# Patient Record
Sex: FEMALE | Race: WHITE
[De-identification: names, ages, dates, MRNs, and addresses within clinical notes are randomized per-mention and may not be internally consistent; named-entity substitution may affect disease eponyms.]

---

## 2019-09-23 ENCOUNTER — FORM ENCOUNTER (OUTPATIENT)
Age: 49
End: 2019-09-23

## 2020-02-10 ENCOUNTER — FORM ENCOUNTER (OUTPATIENT)
Age: 50
End: 2020-02-10

## 2021-02-19 ENCOUNTER — TRANSCRIPTION ENCOUNTER (OUTPATIENT)
Age: 51
End: 2021-02-19

## 2021-03-09 ENCOUNTER — APPOINTMENT (OUTPATIENT)
Dept: BREAST CENTER | Facility: CLINIC | Age: 51
End: 2021-03-09

## 2021-04-16 PROBLEM — Z00.00 ENCOUNTER FOR PREVENTIVE HEALTH EXAMINATION: Status: ACTIVE | Noted: 2021-04-16

## 2021-04-21 DIAGNOSIS — N63.0 UNSPECIFIED LUMP IN UNSPECIFIED BREAST: ICD-10-CM

## 2021-04-21 DIAGNOSIS — G35 MULTIPLE SCLEROSIS: ICD-10-CM

## 2021-04-23 ENCOUNTER — APPOINTMENT (OUTPATIENT)
Dept: BREAST CENTER | Facility: CLINIC | Age: 51
End: 2021-04-23
Payer: COMMERCIAL

## 2021-04-23 VITALS
BODY MASS INDEX: 21.34 KG/M2 | DIASTOLIC BLOOD PRESSURE: 79 MMHG | HEIGHT: 64 IN | WEIGHT: 125 LBS | HEART RATE: 80 BPM | SYSTOLIC BLOOD PRESSURE: 111 MMHG

## 2021-04-23 PROCEDURE — 99072 ADDL SUPL MATRL&STAF TM PHE: CPT

## 2021-04-23 PROCEDURE — 99213 OFFICE O/P EST LOW 20 MIN: CPT

## 2021-04-23 NOTE — REASON FOR VISIT
[Follow-Up: _____] : a [unfilled] follow-up visit [FreeTextEntry1] : The patient comes in with a history of bilateral silicone augmentation implants performed back in 2004 and also has a history of MS.  She has had a history of fibrocystic mastopathy and comes in for routine yearly follow-up

## 2021-04-23 NOTE — HISTORY OF PRESENT ILLNESS
[FreeTextEntry1] : The patient is a 50-year-old  white female with Greenlandic and Moldovan descent.  She underwent menarche at age 15 and had her first child at age 27.  She has no family history of breast or ovarian cancer.  She has a history of bilateral silicone augmentation implants placed back in .  She also has a history of MS.  She underwent a screening mammography on 2019 at [facility] which showed some left breast asymmetry in the upper outer quadrant and inner quadrant and a lobulated density in the right breast 4:00 region.  Diagnostic left breast mammography and ultrasound on 2019 showed the asymmetry in the left breast upper outer quadrant to dissipate but she continued to have some asymmetry in the inner quadrant and ultrasound showed a 4 mm density in the 8:00 region of the left breast and ultrasound-guided core biopsy on 2019 showed a radial sclerosing lesion with usual duct hyperplasia.  She had a directed right breast ultrasound showing the right breast 4:00 density to be benign appearing and slide review of the left breast biopsy was consistent with a benign radial sclerosing lesion.  She obtained follow-up of this area which has no longer been visualized on ultrasound and comes in for yearly clinical exam

## 2021-04-23 NOTE — ASSESSMENT
[FreeTextEntry1] : The patient is a 50-year-old  white female with Norwegian and [place] descent.  She underwent menarche at age 15 and had her first child at age 27.  She has no family history of breast or ovarian cancer.  She has a history of bilateral silicone augmentation implants placed back in 2004.  She also has a history of MS.  She underwent a screening mammography on 2019 at [facility] which showed some left breast asymmetry in the upper outer quadrant and inner quadrant and a lobulated density in the right breast 4:00 region.  Diagnostic left breast mammography and ultrasound on 2019 showed the asymmetry in the left breast upper outer quadrant to dissipate but she continued to have some asymmetry in the inner quadrant and ultrasound showed a 4 mm density in the 8:00 region of the left breast and ultrasound-guided core biopsy on 2019 showed a radial sclerosing lesion with usual duct hyperplasia.  She had a directed right breast ultrasound showing the right breast 4:00 density to be benign appearing and slide review of the left breast biopsy was consistent with a benign radial sclerosing lesion.  She obtained follow-up of this area which has no longer been visualized on ultrasound.  On exam today I cannot feel any suspicious densities in either breast.  She underwent a bilateral mammography and ultrasound on 2021 which did show some asymmetry in the left breast lower inner quadrant but compression views and ultrasound showed this to be unchanged with a left breast 8:00 2 mm density which was previously biopsied and benign.  The patient was reassured and should follow-up again in 1 year and her next bilateral mammography and ultrasound will be due in 2022.

## 2021-04-23 NOTE — PHYSICAL EXAM
[Normocephalic] : normocephalic [Atraumatic] : atraumatic [EOMI] : extra ocular movement intact [Supple] : supple [No Supraclavicular Adenopathy] : no supraclavicular adenopathy [No Cervical Adenopathy] : no cervical adenopathy [Examined in the supine and seated position] : examined in the supine and seated position [No dominant masses] : no dominant masses in right breast  [No dominant masses] : no dominant masses left breast [No Nipple Retraction] : no left nipple retraction [No Nipple Discharge] : no left nipple discharge [Breast Mass Right Breast ___cm] : no masses [Breast Mass Left Breast ___cm] : no masses [Breast Nipple Inversion] : nipples not inverted [Breast Nipple Retraction] : nipples not retracted [Breast Nipple Flattening] : nipples not flattened [Breast Nipple Fissures] : nipples not fissured [Breast Abnormal Lactation (Galactorrhea)] : no galactorrhea [Breast Abnormal Secretion Bloody Fluid] : no bloody discharge [Breast Abnormal Secretion Serous Fluid] : no serous discharge [Breast Abnormal Secretion Opalescent Fluid] : no milky discharge [No Axillary Lymphadenopathy] : no left axillary lymphadenopathy [No Edema] : no edema [No Rashes] : no rashes [No Ulceration] : no ulceration [de-identified] : On exam, the patient has B-cup breasts with obvious bilateral augmentation implants.  I cannot feel any suspicious densities in either breast.  She has no axillary, supraclavicular, or cervical adenopathy.

## 2022-04-13 NOTE — HISTORY OF PRESENT ILLNESS
[FreeTextEntry1] : The patient is a 51-year-old  white female with Japanese and Jamaican descent.  She underwent menarche at age 15 and had her first child at age 27.  She has no family history of breast or ovarian cancer.  She has a history of bilateral silicone augmentation implants placed back in .  She also has a history of MS.  She underwent a screening mammography on 2019 at [facility] which showed some left breast asymmetry in the upper outer quadrant and inner quadrant and a lobulated density in the right breast 4:00 region.  Diagnostic left breast mammography and ultrasound on 2019 showed the asymmetry in the left breast upper outer quadrant to dissipate but she continued to have some asymmetry in the inner quadrant and ultrasound showed a 4 mm density in the 8:00 region of the left breast and ultrasound-guided core biopsy on 2019 showed a radial sclerosing lesion with usual duct hyperplasia.  She had a directed right breast ultrasound showing the right breast 4:00 density to be benign appearing and slide review of the left breast biopsy was consistent with a benign radial sclerosing lesion.  She obtained follow-up of this area which has no longer been visualized on ultrasound and comes in for yearly clinical exam and she continues to get yearly mammography and ultrasound.

## 2022-04-13 NOTE — PHYSICAL EXAM
[Normocephalic] : normocephalic [Atraumatic] : atraumatic [EOMI] : extra ocular movement intact [Supple] : supple [No Supraclavicular Adenopathy] : no supraclavicular adenopathy [No Cervical Adenopathy] : no cervical adenopathy [Examined in the supine and seated position] : examined in the supine and seated position [No dominant masses] : no dominant masses in right breast  [No dominant masses] : no dominant masses left breast [No Nipple Retraction] : no left nipple retraction [No Nipple Discharge] : no left nipple discharge [Breast Mass Right Breast ___cm] : no masses [Breast Mass Left Breast ___cm] : no masses [Breast Nipple Inversion] : nipples not inverted [Breast Nipple Retraction] : nipples not retracted [Breast Nipple Flattening] : nipples not flattened [Breast Nipple Fissures] : nipples not fissured [Breast Abnormal Lactation (Galactorrhea)] : no galactorrhea [Breast Abnormal Secretion Bloody Fluid] : no bloody discharge [Breast Abnormal Secretion Serous Fluid] : no serous discharge [Breast Abnormal Secretion Opalescent Fluid] : no milky discharge [No Axillary Lymphadenopathy] : no left axillary lymphadenopathy [No Edema] : no edema [No Rashes] : no rashes [No Ulceration] : no ulceration [de-identified] : On exam, the patient has B-cup breasts with obvious bilateral augmentation implants.  I cannot feel any suspicious densities in either breast.  She has no axillary, supraclavicular, or cervical adenopathy.

## 2022-04-13 NOTE — ASSESSMENT
[FreeTextEntry1] : The patient is a 51-year-old  white female with Jordanian and Cook Islander descent.  She underwent menarche at age 15 and had her first child at age 27.  She has no family history of breast or ovarian cancer.  She has a history of bilateral silicone augmentation implants placed back in .  She also has a history of MS.  She underwent a screening mammography on 2019 at [facility] which showed some left breast asymmetry in the upper outer quadrant and inner quadrant and a lobulated density in the right breast 4:00 region.  Diagnostic left breast mammography and ultrasound on 2019 showed the asymmetry in the left breast upper outer quadrant to dissipate but she continued to have some asymmetry in the inner quadrant and ultrasound showed a 4 mm density in the 8:00 region of the left breast and ultrasound-guided core biopsy on 2019 showed a radial sclerosing lesion with usual duct hyperplasia.  She had a directed right breast ultrasound showing the right breast 4:00 density to be benign appearing and slide review of the left breast biopsy was consistent with a benign radial sclerosing lesion.  She obtained follow-up of this area which has no longer been visualized on ultrasound.  On exam today I cannot feel any suspicious densities in either breast.  She underwent her last bilateral mammography and ultrasound which was reviewed from  ???????? on 2021 which did show some asymmetry in the left breast lower inner quadrant but compression views and ultrasound showed this to be unchanged with a left breast 8:00 2 mm density which was previously biopsied and benign.  The patient was reassured and should follow-up again in 1 year and her next bilateral mammography and ultrasound will be due in ??????? and she was given prescriptions.

## 2022-04-18 ENCOUNTER — APPOINTMENT (OUTPATIENT)
Dept: BREAST CENTER | Facility: CLINIC | Age: 52
End: 2022-04-18
Payer: COMMERCIAL

## 2022-05-23 NOTE — ASSESSMENT
[FreeTextEntry1] : The patient is a 52-year-old  white female with Brazilian and Bruneian descent.  She underwent menarche at age 15 and had her first child at age 27.  She has no family history of breast or ovarian cancer.  She has a history of bilateral silicone augmentation implants placed back in .  She also has a history of MS.  She underwent a screening mammography on 2019 at [facility] which showed some left breast asymmetry in the upper outer quadrant and inner quadrant and a lobulated density in the right breast 4:00 region.  Diagnostic left breast mammography and ultrasound on 2019 showed the asymmetry in the left breast upper outer quadrant to dissipate but she continued to have some asymmetry in the inner quadrant and ultrasound showed a 4 mm density in the 8:00 region of the left breast and ultrasound-guided core biopsy on 2019 showed a radial sclerosing lesion with usual duct hyperplasia.  She had a directed right breast ultrasound showing the right breast 4:00 density to be benign appearing and slide review of the left breast biopsy was consistent with a benign radial sclerosing lesion.  She obtained follow-up of this area which has no longer been visualized on ultrasound.  On exam today, I cannot feel any suspicious densities in either breast.  She underwent her last bilateral mammography and ultrasound which was reviewed from  ???????? on 2021 which did show some asymmetry in the left breast lower inner quadrant but compression views and ultrasound showed this to be unchanged with a left breast 8:00 2 mm density which was previously biopsied and benign.  The patient was reassured and should follow-up again in 1 year and her next bilateral mammography and ultrasound will be due in ??????? and she was given prescriptions.

## 2022-05-23 NOTE — PHYSICAL EXAM
[Normocephalic] : normocephalic [Atraumatic] : atraumatic [EOMI] : extra ocular movement intact [Supple] : supple [No Supraclavicular Adenopathy] : no supraclavicular adenopathy [No Cervical Adenopathy] : no cervical adenopathy [Examined in the supine and seated position] : examined in the supine and seated position [No dominant masses] : no dominant masses in right breast  [No dominant masses] : no dominant masses left breast [No Nipple Retraction] : no left nipple retraction [No Nipple Discharge] : no left nipple discharge [Breast Mass Right Breast ___cm] : no masses [Breast Mass Left Breast ___cm] : no masses [Breast Nipple Inversion] : nipples not inverted [Breast Nipple Retraction] : nipples not retracted [Breast Nipple Flattening] : nipples not flattened [Breast Nipple Fissures] : nipples not fissured [Breast Abnormal Lactation (Galactorrhea)] : no galactorrhea [Breast Abnormal Secretion Bloody Fluid] : no bloody discharge [Breast Abnormal Secretion Serous Fluid] : no serous discharge [Breast Abnormal Secretion Opalescent Fluid] : no milky discharge [No Axillary Lymphadenopathy] : no left axillary lymphadenopathy [No Edema] : no edema [No Rashes] : no rashes [No Ulceration] : no ulceration [de-identified] : On exam, the patient has B-cup breasts with obvious bilateral augmentation implants.  I cannot feel any suspicious densities in either breast.  She has no axillary, supraclavicular, or cervical adenopathy.

## 2022-05-23 NOTE — HISTORY OF PRESENT ILLNESS
[FreeTextEntry1] : The patient is a 52-year-old  white female with Lithuanian and Nicaraguan descent.  She underwent menarche at age 15 and had her first child at age 27.  She has no family history of breast or ovarian cancer.  She has a history of bilateral silicone augmentation implants placed back in .  She also has a history of MS.  She underwent a screening mammography on 2019 at [facility] which showed some left breast asymmetry in the upper outer quadrant and inner quadrant and a lobulated density in the right breast 4:00 region.  Diagnostic left breast mammography and ultrasound on 2019 showed the asymmetry in the left breast upper outer quadrant to dissipate but she continued to have some asymmetry in the inner quadrant and ultrasound showed a 4 mm density in the 8:00 region of the left breast and ultrasound-guided core biopsy on 2019 showed a radial sclerosing lesion with usual duct hyperplasia.  She had a directed right breast ultrasound showing the right breast 4:00 density to be benign appearing and slide review of the left breast biopsy was consistent with a benign radial sclerosing lesion.  She obtained follow-up of this area which has no longer been visualized on ultrasound and comes in for yearly clinical exam and she continues to get yearly mammography and ultrasound.

## 2022-05-24 ENCOUNTER — APPOINTMENT (OUTPATIENT)
Dept: BREAST CENTER | Facility: CLINIC | Age: 52
End: 2022-05-24
Payer: COMMERCIAL

## 2022-06-27 ENCOUNTER — APPOINTMENT (OUTPATIENT)
Dept: BREAST CENTER | Facility: CLINIC | Age: 52
End: 2022-06-27
Payer: COMMERCIAL

## 2022-06-27 DIAGNOSIS — D24.2 BENIGN NEOPLASM OF LEFT BREAST: ICD-10-CM

## 2022-06-27 PROCEDURE — 99213 OFFICE O/P EST LOW 20 MIN: CPT

## 2022-06-27 NOTE — HISTORY OF PRESENT ILLNESS
[FreeTextEntry1] : The patient is a 52-year-old  white female with Kinyarwanda and Greek descent.  She underwent menarche at age 15 and had her first child at age 27.  She has no family history of breast or ovarian cancer.  She has a history of bilateral silicone augmentation implants placed back in .  She also has a history of MS.  She underwent a screening mammography on 2019 at [facility] which showed some left breast asymmetry in the upper outer quadrant and inner quadrant and a lobulated density in the right breast 4:00 region.  Diagnostic left breast mammography and ultrasound on 2019 showed the asymmetry in the left breast upper outer quadrant to dissipate but she continued to have some asymmetry in the inner quadrant and ultrasound showed a 4 mm density in the 8:00 region of the left breast and ultrasound-guided core biopsy on 2019 showed a radial sclerosing lesion with usual duct hyperplasia.  She had a directed right breast ultrasound showing the right breast 4:00 density to be benign appearing and slide review of the left breast biopsy was consistent with a benign radial sclerosing lesion.  She obtained follow-up of this area which has no longer been visualized on ultrasound and comes in for yearly clinical exam and she continues to get yearly mammography and ultrasound.

## 2022-06-27 NOTE — PHYSICAL EXAM
[Normocephalic] : normocephalic [Atraumatic] : atraumatic [EOMI] : extra ocular movement intact [Supple] : supple [No Supraclavicular Adenopathy] : no supraclavicular adenopathy [No Cervical Adenopathy] : no cervical adenopathy [Examined in the supine and seated position] : examined in the supine and seated position [No dominant masses] : no dominant masses in right breast  [No dominant masses] : no dominant masses left breast [No Nipple Retraction] : no left nipple retraction [No Nipple Discharge] : no left nipple discharge [Breast Mass Right Breast ___cm] : no masses [Breast Mass Left Breast ___cm] : no masses [No Axillary Lymphadenopathy] : no left axillary lymphadenopathy [No Edema] : no edema [No Rashes] : no rashes [No Ulceration] : no ulceration [Breast Nipple Inversion] : nipples not inverted [Breast Nipple Retraction] : nipples not retracted [Breast Nipple Flattening] : nipples not flattened [Breast Nipple Fissures] : nipples not fissured [Breast Abnormal Lactation (Galactorrhea)] : no galactorrhea [Breast Abnormal Secretion Bloody Fluid] : no bloody discharge [Breast Abnormal Secretion Serous Fluid] : no serous discharge [Breast Abnormal Secretion Opalescent Fluid] : no milky discharge [de-identified] : On exam, the patient has B-cup breasts with obvious bilateral augmentation implants.  I cannot feel any suspicious densities in either breast.  She has no axillary, supraclavicular, or cervical adenopathy.

## 2023-08-08 ENCOUNTER — APPOINTMENT (OUTPATIENT)
Dept: BREAST CENTER | Facility: CLINIC | Age: 53
End: 2023-08-08
Payer: COMMERCIAL

## 2023-08-08 VITALS
HEIGHT: 64 IN | OXYGEN SATURATION: 100 % | DIASTOLIC BLOOD PRESSURE: 61 MMHG | HEART RATE: 74 BPM | WEIGHT: 135 LBS | SYSTOLIC BLOOD PRESSURE: 97 MMHG | BODY MASS INDEX: 23.05 KG/M2

## 2023-08-08 DIAGNOSIS — Z12.31 ENCOUNTER FOR SCREENING MAMMOGRAM FOR MALIGNANT NEOPLASM OF BREAST: ICD-10-CM

## 2023-08-08 DIAGNOSIS — N60.11 DIFFUSE CYSTIC MASTOPATHY OF LEFT BREAST: ICD-10-CM

## 2023-08-08 DIAGNOSIS — N60.12 DIFFUSE CYSTIC MASTOPATHY OF LEFT BREAST: ICD-10-CM

## 2023-08-08 PROCEDURE — 99213 OFFICE O/P EST LOW 20 MIN: CPT

## 2023-08-08 NOTE — ASSESSMENT
[FreeTextEntry1] : The patient is a 53-year-old  white female with Malian and Lebanese descent.  She underwent menarche at age 15 and had her first child at age 27.  She has no family history of breast or ovarian cancer.  She has a history of bilateral silicone augmentation implants placed back in .  She also has a history of MS.  She underwent a screening mammography on 2019 at [facility] which showed some left breast asymmetry in the upper outer quadrant and inner quadrant and a lobulated density in the right breast 4:00 region.  Diagnostic left breast mammography and ultrasound on 2019 showed the asymmetry in the left breast upper outer quadrant to dissipate but she continued to have some asymmetry in the inner quadrant and ultrasound showed a 4 mm density in the 8:00 region of the left breast and ultrasound-guided core biopsy on 2019 showed a radial sclerosing lesion with usual duct hyperplasia.  She had a directed right breast ultrasound showing the right breast 4:00 density to be benign appearing and slide review of the left breast biopsy was consistent with a benign radial sclerosing lesion.  She obtained follow-up of this area which has no longer been visualized on ultrasound.  On exam today, I cannot feel any suspicious densities in either breast.  She underwent her last bilateral mammography and ultrasound was performed on May 10, 2022 at [facility] which did not show any suspicious findings.  She is due for her bilateral mammography and ultrasound now and will schedule this up at [facility] and we will follow-up on the results.  If this is unchanged and negative, she should follow-up again in 1 year and her next bilateral mammography and ultrasound will be due in 1 year.

## 2023-08-08 NOTE — PHYSICAL EXAM
[Normocephalic] : normocephalic [Atraumatic] : atraumatic [EOMI] : extra ocular movement intact [Supple] : supple [No Supraclavicular Adenopathy] : no supraclavicular adenopathy [No Cervical Adenopathy] : no cervical adenopathy [Examined in the supine and seated position] : examined in the supine and seated position [No dominant masses] : no dominant masses in right breast  [No dominant masses] : no dominant masses left breast [No Nipple Retraction] : no left nipple retraction [No Nipple Discharge] : no left nipple discharge [Breast Mass Right Breast ___cm] : no masses [Breast Mass Left Breast ___cm] : no masses [No Axillary Lymphadenopathy] : no right axillary lymphadenopathy [No Edema] : no edema [No Rashes] : no rashes [No Ulceration] : no ulceration [Breast Nipple Inversion] : nipples not inverted [Breast Nipple Retraction] : nipples not retracted [Breast Nipple Flattening] : nipples not flattened [Breast Nipple Fissures] : nipples not fissured [Breast Abnormal Lactation (Galactorrhea)] : no galactorrhea [Breast Abnormal Secretion Serous Fluid] : no serous discharge [Breast Abnormal Secretion Bloody Fluid] : no bloody discharge [Breast Abnormal Secretion Opalescent Fluid] : no milky discharge [de-identified] : On exam, the patient has B-cup breasts with obvious bilateral augmentation implants.  I cannot feel any suspicious densities in either breast.  She has no axillary, supraclavicular, or cervical adenopathy.

## 2023-08-08 NOTE — HISTORY OF PRESENT ILLNESS
[FreeTextEntry1] : The patient is a 53-year-old  white female with Kyrgyz and Slovenian descent.  She underwent menarche at age 15 and had her first child at age 27.  She has no family history of breast or ovarian cancer.  She has a history of bilateral silicone augmentation implants placed back in .  She also has a history of MS.  She underwent a screening mammography on 2019 at [facility] which showed some left breast asymmetry in the upper outer quadrant and inner quadrant and a lobulated density in the right breast 4:00 region.  Diagnostic left breast mammography and ultrasound on 2019 showed the asymmetry in the left breast upper outer quadrant to dissipate but she continued to have some asymmetry in the inner quadrant and ultrasound showed a 4 mm density in the 8:00 region of the left breast and ultrasound-guided core biopsy on 2019 showed a radial sclerosing lesion with usual duct hyperplasia.  She had a directed right breast ultrasound showing the right breast 4:00 density to be benign appearing and slide review of the left breast biopsy was consistent with a benign radial sclerosing lesion.  She obtained follow-up of this area which has no longer been visualized on ultrasound and comes in for yearly clinical exam and she continues to get yearly mammography and ultrasound.

## 2023-08-31 ENCOUNTER — RESULT REVIEW (OUTPATIENT)
Age: 53
End: 2023-08-31

## 2023-12-08 ENCOUNTER — OFFICE (OUTPATIENT)
Dept: URBAN - METROPOLITAN AREA CLINIC 122 | Facility: CLINIC | Age: 53
Setting detail: OPHTHALMOLOGY
End: 2023-12-08
Payer: MEDICARE

## 2023-12-08 DIAGNOSIS — H01.002: ICD-10-CM

## 2023-12-08 DIAGNOSIS — H01.005: ICD-10-CM

## 2023-12-08 PROCEDURE — 92004 COMPRE OPH EXAM NEW PT 1/>: CPT | Performed by: OPHTHALMOLOGY

## 2023-12-08 ASSESSMENT — REFRACTION_CURRENTRX
OD_SPHERE: +2.25
OS_SPHERE: +2.25
OS_OVR_VA: 20/
OD_CYLINDER: SPH
OD_ADD: +2.25
OS_CYLINDER: SPH
OD_OVR_VA: 20/
OS_ADD: +2.25

## 2023-12-08 ASSESSMENT — LID EXAM ASSESSMENTS
OS_BLEPHARITIS: LLL 2+
OD_BLEPHARITIS: RLL 2+

## 2023-12-08 ASSESSMENT — CONFRONTATIONAL VISUAL FIELD TEST (CVF)
OS_FINDINGS: FULL
OD_FINDINGS: FULL

## 2024-09-13 ENCOUNTER — APPOINTMENT (OUTPATIENT)
Dept: THORACIC SURGERY | Facility: CLINIC | Age: 54
End: 2024-09-13
Payer: COMMERCIAL

## 2024-09-13 VITALS — WEIGHT: 130 LBS | BODY MASS INDEX: 22.2 KG/M2 | HEIGHT: 64 IN

## 2024-09-13 DIAGNOSIS — Z87.891 PERSONAL HISTORY OF NICOTINE DEPENDENCE: ICD-10-CM

## 2024-09-13 PROCEDURE — G0296 VISIT TO DETERM LDCT ELIG: CPT

## 2024-09-13 NOTE — ASSESSMENT
[Ready] : Patient is ready for cessation intervention [Action] : Action: The patient is actively trying to quit smoking or has quit smoking in the past 6 months  [de-identified] : She is currently using the NRT patch. She is not using NRT gum or lozenge as she does not like the taste. Made aware that there are other flavors and may consider using the gum or lozenge for break through cravings. Acknowledged how  difficult it is to quit smoking. Advised that quitting smoking is the most important thing a person can do for their health. Discussed strategies to deal with cravings. Suggested keeping a log of cigarette use and the triggers in an effort to identify triggers and break routines/habits.  She agrees to call writer at any point in the future he wishes to discuss smoking cessation programs with [facility].

## 2024-09-13 NOTE — PLAN
[Smoking Cessation Guidance Provided] : Smoking cessation guidance was provided to patient [Smoking Cessation] : smoking cessation [FreeTextEntry1] : Plan:  -Low dose CT chest for lung cancer screening. YAMILKA ESTRELLA ordered the low dose CT.         -Follow up with  her referring provider after her LDCT results have been reviewed by the multidisciplinary clinical team, if needed.    -Encourage continued smoking abstinence.     -Patient declines referral to [facility]    Should I screen? tool utilized. 6 Year risk of lung cancer is   0.5%.    Patient wishes to proceed with screening.   Engaged in discussion regarding risks of screening during Covid-19 pandemic and precautions that are being used  to reduce exposure.   Engaged in shared decision making with MsRusty WHITTAK . Answered all questions. She verbalized understanding and agreement. She knows to call back with and questions or concerns.

## 2024-09-13 NOTE — REASON FOR VISIT
[Other Location: e.g. School (Enter Location, City,State)___] : at [unfilled], at the time of the visit. [Other Location: e.g. Home (Enter Location, City,State)___] : at [unfilled] [Verbal consent obtained from patient] : the patient, [unfilled] [Initial Evaluation] : an initial evaluation visit [Virtual Visit] : virtual visit

## 2024-12-13 ENCOUNTER — OFFICE (OUTPATIENT)
Dept: URBAN - METROPOLITAN AREA CLINIC 122 | Facility: CLINIC | Age: 54
Setting detail: OPHTHALMOLOGY
End: 2024-12-13
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
## 2024-12-13 PROCEDURE — NO SHOW FE NO SHOW FEE: Performed by: OPHTHALMOLOGY
